# Patient Record
Sex: MALE | Race: OTHER | URBAN - METROPOLITAN AREA
[De-identification: names, ages, dates, MRNs, and addresses within clinical notes are randomized per-mention and may not be internally consistent; named-entity substitution may affect disease eponyms.]

---

## 2017-06-29 ENCOUNTER — IMPORTED ENCOUNTER (OUTPATIENT)
Dept: URBAN - METROPOLITAN AREA CLINIC 9 | Facility: CLINIC | Age: 62
End: 2017-06-29

## 2018-12-04 ENCOUNTER — IMPORTED ENCOUNTER (OUTPATIENT)
Dept: URBAN - METROPOLITAN AREA CLINIC 9 | Facility: CLINIC | Age: 63
End: 2018-12-04

## 2019-12-16 ENCOUNTER — IMPORTED ENCOUNTER (OUTPATIENT)
Dept: URBAN - METROPOLITAN AREA CLINIC 9 | Facility: CLINIC | Age: 64
End: 2019-12-16

## 2021-08-19 ENCOUNTER — IMPORTED ENCOUNTER (OUTPATIENT)
Dept: URBAN - METROPOLITAN AREA CLINIC 9 | Facility: CLINIC | Age: 66
End: 2021-08-19

## 2021-08-19 PROBLEM — H25.13: Noted: 2021-08-19

## 2021-08-19 PROBLEM — H02.831: Noted: 2021-08-19

## 2021-08-19 PROBLEM — H04.123: Noted: 2021-08-19

## 2021-08-19 PROBLEM — H02.834: Noted: 2021-08-19

## 2021-08-19 PROBLEM — H10.45: Noted: 2021-08-19

## 2021-10-15 ASSESSMENT — KERATOMETRY
OD_AXISANGLE2_DEGREES: 93
OS_AXISANGLE_DEGREES: 165
OD_AXISANGLE_DEGREES: 179
OS_K1POWER_DIOPTERS: 43.75
OS_AXISANGLE2_DEGREES: 76
OS_K1POWER_DIOPTERS: 44.25
OS_AXISANGLE_DEGREES: 166
OD_K2POWER_DIOPTERS: 45.25
OS_AXISANGLE_DEGREES: 165
OS_AXISANGLE2_DEGREES: 75
OD_K1POWER_DIOPTERS: 43
OD_AXISANGLE_DEGREES: 3
OD_K2POWER_DIOPTERS: 45
OD_AXISANGLE2_DEGREES: 83
OD_AXISANGLE2_DEGREES: 89
OD_K1POWER_DIOPTERS: 43.25
OD_K1POWER_DIOPTERS: 43.5
OS_K2POWER_DIOPTERS: 44.5
OD_K2POWER_DIOPTERS: 42.25
OS_AXISANGLE2_DEGREES: 86
OD_K1POWER_DIOPTERS: 43.5
OS_K2POWER_DIOPTERS: 44.5
OS_K1POWER_DIOPTERS: 44
OS_K2POWER_DIOPTERS: 44
OS_AXISANGLE_DEGREES: 176
OD_AXISANGLE2_DEGREES: 87
OD_AXISANGLE_DEGREES: 177
OS_K1POWER_DIOPTERS: 44
OD_AXISANGLE_DEGREES: 173
OS_K2POWER_DIOPTERS: 45.25
OS_AXISANGLE2_DEGREES: 75
OD_K2POWER_DIOPTERS: 45.25

## 2021-10-15 ASSESSMENT — TONOMETRY
OS_IOP_MMHG: 13
OD_IOP_MMHG: 11
OS_IOP_MMHG: 11
OD_IOP_MMHG: 16
OD_IOP_MMHG: 10
OS_IOP_MMHG: 16
OS_IOP_MMHG: 12
OD_IOP_MMHG: 11

## 2021-10-15 ASSESSMENT — VISUAL ACUITY
OS_SC: 20/100 + SN
OD_SC: 20/70 - SN
OD_CC: 20/20 - SN
OD_CC: 20/20 SN
OD_CC: 20/20 SN
OS_CC: 20/20 SN
OS_SC: 20/40 -2 SN
OD_SC: 20/100 + SN
OD_PH: 20/30 - SN
OS_CC: 20/25 -2 SN
OS_CC: 20/20 SN
OS_SC: 20/60 - SN
OS_CC: 20/20 - SN
OD_SC: 20/60 - SN
OD_CC: 20/20 SN
OD_CC: 20/25 SN
OS_CC: 14.0
OS_CC: 20/20 SN
OD_CC: 14.5

## 2022-06-29 RX ORDER — PHENTERMINE AND TOPIRAMATE 7.5; 46 MG/1; MG/1
1 CAPSULE, EXTENDED RELEASE ORAL
COMMUNITY
End: 2022-10-20

## 2022-06-29 RX ORDER — ATORVASTATIN CALCIUM 20 MG/1
TABLET, FILM COATED ORAL
COMMUNITY

## 2022-06-29 RX ORDER — HYDROCHLOROTHIAZIDE 25 MG/1
TABLET ORAL
COMMUNITY
End: 2022-08-22 | Stop reason: CLARIF

## 2022-06-29 RX ORDER — ALLOPURINOL 100 MG/1
TABLET ORAL
COMMUNITY
End: 2022-10-03

## 2022-06-29 RX ORDER — AZELASTINE 1 MG/ML
SPRAY, METERED NASAL
COMMUNITY

## 2022-10-03 ENCOUNTER — ESTABLISHED PATIENT (OUTPATIENT)
Dept: URBAN - METROPOLITAN AREA CLINIC 9 | Facility: CLINIC | Age: 67
End: 2022-10-03

## 2022-10-03 DIAGNOSIS — H01.02B: ICD-10-CM

## 2022-10-03 DIAGNOSIS — H52.13: ICD-10-CM

## 2022-10-03 DIAGNOSIS — H01.02A: ICD-10-CM

## 2022-10-03 DIAGNOSIS — H25.13: ICD-10-CM

## 2022-10-03 DIAGNOSIS — H04.123: ICD-10-CM

## 2022-10-03 PROCEDURE — 92015 DETERMINE REFRACTIVE STATE: CPT

## 2022-10-03 PROCEDURE — 92310B CONTACT LEN 60

## 2022-10-03 PROCEDURE — 92014 COMPRE OPH EXAM EST PT 1/>: CPT

## 2022-10-03 ASSESSMENT — KERATOMETRY
OS_AXISANGLE_DEGREES: 155
OD_AXISANGLE_DEGREES: 180
OS_K1POWER_DIOPTERS: 43.75
OS_AXISANGLE2_DEGREES: 65
OD_K2POWER_DIOPTERS: 45.0
OD_K1POWER_DIOPTERS: 43.50
OD_AXISANGLE2_DEGREES: 90
OS_K2POWER_DIOPTERS: 44.50

## 2022-10-03 ASSESSMENT — VISUAL ACUITY
OD_CC: J1+
OU_CC: J1+
OU_CC: 20/20
OS_CC: 20/20
OD_CC: 20/20
OS_CC: J1+

## 2022-10-03 ASSESSMENT — TONOMETRY
OD_IOP_MMHG: 11
OS_IOP_MMHG: 11

## 2022-10-17 PROBLEM — R23.8 IMPAIRED TISSUE INTEGRITY: Status: ACTIVE | Noted: 2022-10-17

## 2022-10-17 PROBLEM — E66.01 MORBID (SEVERE) OBESITY DUE TO EXCESS CALORIES (HCC): Status: ACTIVE | Noted: 2022-10-17

## 2022-10-17 PROBLEM — Z96.659 HISTORY OF TOTAL KNEE ARTHROPLASTY: Status: ACTIVE | Noted: 2021-05-05

## 2022-10-17 PROBLEM — J30.1 SEASONAL ALLERGIC RHINITIS DUE TO POLLEN: Status: ACTIVE | Noted: 2022-10-17

## 2022-10-17 PROBLEM — E78.2 MIXED HYPERLIPIDEMIA: Status: ACTIVE | Noted: 2022-10-17

## 2022-10-17 PROBLEM — Z96.619 STATUS POST SHOULDER JOINT REPLACEMENT: Status: ACTIVE | Noted: 2019-08-28

## 2022-10-17 PROBLEM — J30.2 SEASONAL ALLERGIES: Status: ACTIVE | Noted: 2022-10-17

## 2022-10-17 PROBLEM — M10.9 GOUT: Status: ACTIVE | Noted: 2022-10-17

## 2022-10-17 PROBLEM — I10 ESSENTIAL (PRIMARY) HYPERTENSION: Status: ACTIVE | Noted: 2022-10-17

## 2022-10-17 PROBLEM — M17.0 OSTEOARTHRITIS OF BOTH KNEES: Status: ACTIVE | Noted: 2022-10-17

## 2022-10-17 PROBLEM — M15.9 PRIMARY OSTEOARTHRITIS INVOLVING MULTIPLE JOINTS: Status: ACTIVE | Noted: 2022-10-17

## 2022-10-17 PROBLEM — M54.9 BACKACHE: Status: ACTIVE | Noted: 2022-10-17

## 2022-10-17 PROBLEM — M25.569 KNEE PAIN: Status: ACTIVE | Noted: 2022-10-17

## 2022-10-17 PROBLEM — S83.249A TEAR OF MEDIAL MENISCUS OF KNEE: Status: ACTIVE | Noted: 2020-01-29

## 2022-10-17 PROBLEM — Z87.898 HISTORY OF VERTIGO: Status: ACTIVE | Noted: 2022-10-17

## 2022-10-17 PROBLEM — Z87.19 H/O: GASTROINTESTINAL DISEASE: Status: ACTIVE | Noted: 2022-10-17

## 2022-10-17 PROBLEM — G47.33 OBSTRUCTIVE SLEEP APNEA SYNDROME: Status: ACTIVE | Noted: 2022-10-17

## 2022-10-17 PROBLEM — R10.9 ABDOMINAL PAIN: Status: ACTIVE | Noted: 2022-10-17

## 2022-10-17 PROBLEM — Z86.19 HISTORY OF HERPES ZOSTER: Status: ACTIVE | Noted: 2022-10-17

## 2022-10-17 PROBLEM — Z96.652 HISTORY OF ARTHROPLASTY OF LEFT KNEE: Status: ACTIVE | Noted: 2021-07-21

## 2022-10-17 PROBLEM — K57.92 DIVERTICULITIS: Status: ACTIVE | Noted: 2022-10-17

## 2022-10-17 PROBLEM — M65.9 SYNOVITIS OF LEFT KNEE: Status: ACTIVE | Noted: 2020-07-30

## 2022-10-17 PROBLEM — R52 ALTERATION IN COMFORT ASSOCIATED WITH PAIN: Status: ACTIVE | Noted: 2022-10-17

## 2022-10-17 PROBLEM — Z99.89 DEPENDENCE ON OTHER ENABLING MACHINES AND DEVICES: Status: ACTIVE | Noted: 2022-10-17

## 2022-10-20 PROBLEM — J45.901 REACTIVE AIRWAY DISEASE WITH ACUTE EXACERBATION: Status: ACTIVE | Noted: 2022-10-20

## 2022-10-21 PROBLEM — R73.01 IFG (IMPAIRED FASTING GLUCOSE): Status: ACTIVE | Noted: 2022-10-21

## 2022-10-23 PROBLEM — S91.312A LACERATION OF LEFT FOOT: Status: ACTIVE | Noted: 2022-10-23

## 2023-07-18 ENCOUNTER — FOLLOW UP (OUTPATIENT)
Dept: URBAN - METROPOLITAN AREA CLINIC 9 | Facility: CLINIC | Age: 68
End: 2023-07-18

## 2023-07-18 DIAGNOSIS — H01.02A: ICD-10-CM

## 2023-07-18 DIAGNOSIS — H04.123: ICD-10-CM

## 2023-07-18 DIAGNOSIS — H01.02B: ICD-10-CM

## 2023-07-18 PROCEDURE — 92012 INTRM OPH EXAM EST PATIENT: CPT

## 2023-07-18 ASSESSMENT — KERATOMETRY
OS_K1POWER_DIOPTERS: 43.75
OS_K2POWER_DIOPTERS: 44.50
OD_AXISANGLE_DEGREES: 180
OD_AXISANGLE2_DEGREES: 90
OD_K1POWER_DIOPTERS: 43.50
OS_AXISANGLE2_DEGREES: 65
OS_AXISANGLE_DEGREES: 155
OD_K2POWER_DIOPTERS: 45.0

## 2023-07-18 ASSESSMENT — VISUAL ACUITY
OU_SC: 20/20-1
OD_SC: 20/25-1
OS_SC: 20/20

## 2023-07-18 ASSESSMENT — TONOMETRY
OS_IOP_MMHG: 14
OD_IOP_MMHG: 17

## 2023-08-21 ENCOUNTER — FOLLOW UP (OUTPATIENT)
Dept: URBAN - METROPOLITAN AREA CLINIC 9 | Facility: CLINIC | Age: 68
End: 2023-08-21

## 2023-08-21 DIAGNOSIS — H01.02A: ICD-10-CM

## 2023-08-21 DIAGNOSIS — H01.02B: ICD-10-CM

## 2023-08-21 DIAGNOSIS — H04.123: ICD-10-CM

## 2023-08-21 PROCEDURE — 99213 OFFICE O/P EST LOW 20 MIN: CPT

## 2023-08-21 ASSESSMENT — KERATOMETRY
OS_AXISANGLE2_DEGREES: 65
OS_K1POWER_DIOPTERS: 43.75
OD_K1POWER_DIOPTERS: 43.50
OS_K2POWER_DIOPTERS: 44.50
OD_AXISANGLE_DEGREES: 180
OD_AXISANGLE2_DEGREES: 90
OD_K2POWER_DIOPTERS: 45.0
OS_AXISANGLE_DEGREES: 155

## 2023-08-21 ASSESSMENT — VISUAL ACUITY
OU_SC: 20/20
OS_SC: 20/20
OD_SC: 20/20-1

## 2023-08-21 ASSESSMENT — TONOMETRY
OD_IOP_MMHG: 18
OS_IOP_MMHG: 18

## 2023-10-02 ENCOUNTER — ESTABLISHED PATIENT (OUTPATIENT)
Facility: LOCATION | Age: 68
End: 2023-10-02

## 2023-10-02 DIAGNOSIS — H01.02B: ICD-10-CM

## 2023-10-02 DIAGNOSIS — H01.02A: ICD-10-CM

## 2023-10-02 DIAGNOSIS — H04.123: ICD-10-CM

## 2023-10-02 DIAGNOSIS — H52.13: ICD-10-CM

## 2023-10-02 DIAGNOSIS — H25.13: ICD-10-CM

## 2023-10-02 PROCEDURE — 92014 COMPRE OPH EXAM EST PT 1/>: CPT

## 2023-10-02 PROCEDURE — 92310E CONTACT LENS 100

## 2023-10-02 PROCEDURE — 92015 DETERMINE REFRACTIVE STATE: CPT

## 2023-10-02 ASSESSMENT — KERATOMETRY
OD_K1POWER_DIOPTERS: 43.25
OS_K1POWER_DIOPTERS: 44.00
OS_AXISANGLE2_DEGREES: 72
OD_AXISANGLE_DEGREES: 180
OD_AXISANGLE2_DEGREES: 90
OS_K2POWER_DIOPTERS: 44.50
OS_AXISANGLE_DEGREES: 162
OD_K2POWER_DIOPTERS: 45.00

## 2023-10-02 ASSESSMENT — TONOMETRY
OS_IOP_MMHG: 14
OD_IOP_MMHG: 14

## 2023-10-02 ASSESSMENT — VISUAL ACUITY
OD_SC: 20/20-2
OS_SC: 20/30-1

## 2024-04-26 ENCOUNTER — APPOINTMENT (RX ONLY)
Dept: URBAN - METROPOLITAN AREA CLINIC 20 | Facility: CLINIC | Age: 69
Setting detail: DERMATOLOGY
End: 2024-04-26

## 2024-04-26 DIAGNOSIS — D18.0 HEMANGIOMA: ICD-10-CM

## 2024-04-26 DIAGNOSIS — L81.4 OTHER MELANIN HYPERPIGMENTATION: ICD-10-CM

## 2024-04-26 DIAGNOSIS — L82.1 OTHER SEBORRHEIC KERATOSIS: ICD-10-CM

## 2024-04-26 DIAGNOSIS — D22 MELANOCYTIC NEVI: ICD-10-CM

## 2024-04-26 PROBLEM — D22.5 MELANOCYTIC NEVI OF TRUNK: Status: ACTIVE | Noted: 2024-04-26

## 2024-04-26 PROBLEM — D18.01 HEMANGIOMA OF SKIN AND SUBCUTANEOUS TISSUE: Status: ACTIVE | Noted: 2024-04-26

## 2024-04-26 PROBLEM — C44.519 BASAL CELL CARCINOMA OF SKIN OF OTHER PART OF TRUNK: Status: ACTIVE | Noted: 2024-04-26

## 2024-04-26 PROCEDURE — ? BIOPSY BY SHAVE METHOD

## 2024-04-26 PROCEDURE — 11102 TANGNTL BX SKIN SINGLE LES: CPT

## 2024-04-26 PROCEDURE — ? COUNSELING

## 2024-04-26 PROCEDURE — 99213 OFFICE O/P EST LOW 20 MIN: CPT | Mod: 25

## 2024-04-26 ASSESSMENT — LOCATION SIMPLE DESCRIPTION DERM
LOCATION SIMPLE: UPPER BACK
LOCATION SIMPLE: LEFT UPPER BACK
LOCATION SIMPLE: LEFT LOWER BACK
LOCATION SIMPLE: RIGHT UPPER BACK

## 2024-04-26 ASSESSMENT — LOCATION DETAILED DESCRIPTION DERM
LOCATION DETAILED: LEFT SUPERIOR UPPER BACK
LOCATION DETAILED: RIGHT SUPERIOR UPPER BACK
LOCATION DETAILED: LEFT INFERIOR MEDIAL MIDBACK
LOCATION DETAILED: INFERIOR THORACIC SPINE

## 2024-04-26 ASSESSMENT — LOCATION ZONE DERM: LOCATION ZONE: TRUNK

## 2024-04-26 NOTE — PROCEDURE: BIOPSY BY SHAVE METHOD
Detail Level: Detailed
Depth Of Biopsy: dermis
Was A Bandage Applied: Yes
Size Of Lesion In Cm: 0
Biopsy Type: H and E
Biopsy Method: Personna blade
Anesthesia Type: 1% lidocaine with epinephrine
Anesthesia Volume In Cc: 0.5
Hemostasis: Aluminum Chloride
Wound Care: Petrolatum
Dressing: Band-Aid
Destruction After The Procedure: No
Type Of Destruction Used: Curettage
Curettage Text: The wound bed was treated with curettage after the biopsy was performed.
Cryotherapy Text: The wound bed was treated with cryotherapy after the biopsy was performed.
Electrodesiccation Text: The wound bed was treated with electrodesiccation after the biopsy was performed.
Electrodesiccation And Curettage Text: The wound bed was treated with electrodesiccation and curettage after the biopsy was performed.
Silver Nitrate Text: The wound bed was treated with silver nitrate after the biopsy was performed.
Lab: [id number]
Lab Facility: [id number]
Path Notes (To The Dermatopathologist): 0.4cm
Consent: Written consent was obtained and risks were reviewed including but not limited to scarring, infection, bleeding, scabbing, incomplete removal, nerve damage and allergy to anesthesia.
Post-Care Instructions: The patient was instructed on proper post care to the biopsy site. Leave the bandage in place with the site clean and dry for 24 hours. Then remove the bandage, clean with mild soap and water, dry the site , apply fresh vaseline/aquaphor and a new bandage. Continue daily until the site is fully healed.
Notification Instructions: Patient will be notified of biopsy results. However, patient instructed to call the office if not contacted within 2 weeks.
Billing Type: Third-Party Bill
Information: Selecting Yes will display possible errors in your note based on the variables you have selected. This validation is only offered as a suggestion for you. PLEASE NOTE THAT THE VALIDATION TEXT WILL BE REMOVED WHEN YOU FINALIZE YOUR NOTE. IF YOU WANT TO FAX A PRELIMINARY NOTE YOU WILL NEED TO TOGGLE THIS TO 'NO' IF YOU DO NOT WANT IT IN YOUR FAXED NOTE.

## 2024-07-30 ENCOUNTER — APPOINTMENT (RX ONLY)
Dept: URBAN - METROPOLITAN AREA CLINIC 20 | Facility: CLINIC | Age: 69
Setting detail: DERMATOLOGY
End: 2024-07-30

## 2024-07-30 DIAGNOSIS — Z71.89 OTHER SPECIFIED COUNSELING: ICD-10-CM

## 2024-07-30 DIAGNOSIS — D485 NEOPLASM OF UNCERTAIN BEHAVIOR OF SKIN: ICD-10-CM

## 2024-07-30 DIAGNOSIS — L57.8 OTHER SKIN CHANGES DUE TO CHRONIC EXPOSURE TO NONIONIZING RADIATION: ICD-10-CM

## 2024-07-30 PROBLEM — D48.5 NEOPLASM OF UNCERTAIN BEHAVIOR OF SKIN: Status: ACTIVE | Noted: 2024-07-30

## 2024-07-30 PROCEDURE — 99213 OFFICE O/P EST LOW 20 MIN: CPT | Mod: 25

## 2024-07-30 PROCEDURE — ? BIOPSY BY SHAVE METHOD

## 2024-07-30 PROCEDURE — 11102 TANGNTL BX SKIN SINGLE LES: CPT

## 2024-07-30 PROCEDURE — ? COUNSELING

## 2024-07-30 ASSESSMENT — LOCATION SIMPLE DESCRIPTION DERM
LOCATION SIMPLE: LEFT CHEEK
LOCATION SIMPLE: LEFT KNEE

## 2024-07-30 ASSESSMENT — LOCATION DETAILED DESCRIPTION DERM
LOCATION DETAILED: LEFT KNEE
LOCATION DETAILED: LEFT INFERIOR CENTRAL MALAR CHEEK
LOCATION DETAILED: LEFT CENTRAL MALAR CHEEK

## 2024-07-30 ASSESSMENT — LOCATION ZONE DERM
LOCATION ZONE: LEG
LOCATION ZONE: FACE

## 2024-07-30 ASSESSMENT — LESION DIAMETER IN CM
PLEASE MEASURE THE GREATEST DIAMETER IN CM. IF MORE THAN ONE LESION, PLEASE SUM THE GREATEST DIAMETER OF ALL TREATED LESIONS.: 0.6

## 2024-07-30 NOTE — PROCEDURE: BIOPSY BY SHAVE METHOD
Detail Level: Detailed
Depth Of Biopsy: dermis
Was A Bandage Applied: Yes
Size Of Lesion In Cm: 0
Biopsy Type: H and E
Biopsy Method: Personna blade
Anesthesia Type: 1% lidocaine with epinephrine
Anesthesia Volume In Cc: 0.5
Hemostasis: Aluminum Chloride
Wound Care: Petrolatum
Dressing: Band-Aid
Destruction After The Procedure: No
Type Of Destruction Used: Curettage
Curettage Text: The wound bed was treated with curettage after the biopsy was performed.
Cryotherapy Text: The wound bed was treated with cryotherapy after the biopsy was performed.
Electrodesiccation Text: The wound bed was treated with electrodesiccation after the biopsy was performed.
Electrodesiccation And Curettage Text: The wound bed was treated with electrodesiccation and curettage after the biopsy was performed.
Silver Nitrate Text: The wound bed was treated with silver nitrate after the biopsy was performed.
Lab: [id number]
Lab Facility: 
Consent: Written consent was obtained and risks were reviewed including but not limited to scarring, infection, bleeding, scabbing, incomplete removal, nerve damage and allergy to anesthesia.
Post-Care Instructions: The patient was instructed on proper post care to the biopsy site. Leave the bandage in place with the site clean and dry for 24 hours. Then remove the bandage, clean with mild soap and water, dry the site , apply fresh vaseline/aquaphor and a new bandage. Continue daily until the site is fully healed.
Notification Instructions: Patient will be notified of biopsy results. However, patient instructed to call the office if not contacted within 2 weeks.
Billing Type: Third-Party Bill
Information: Selecting Yes will display possible errors in your note based on the variables you have selected. This validation is only offered as a suggestion for you. PLEASE NOTE THAT THE VALIDATION TEXT WILL BE REMOVED WHEN YOU FINALIZE YOUR NOTE. IF YOU WANT TO FAX A PRELIMINARY NOTE YOU WILL NEED TO TOGGLE THIS TO 'NO' IF YOU DO NOT WANT IT IN YOUR FAXED NOTE.

## 2024-10-02 ENCOUNTER — COMPREHENSIVE EXAM (OUTPATIENT)
Facility: LOCATION | Age: 69
End: 2024-10-02

## 2024-10-02 DIAGNOSIS — H52.13: ICD-10-CM

## 2024-10-02 DIAGNOSIS — H25.13: ICD-10-CM

## 2024-10-02 DIAGNOSIS — H04.123: ICD-10-CM

## 2024-10-02 DIAGNOSIS — H43.811: ICD-10-CM

## 2024-10-02 DIAGNOSIS — H10.45: ICD-10-CM

## 2024-10-02 DIAGNOSIS — H01.02A: ICD-10-CM

## 2024-10-02 DIAGNOSIS — H01.02B: ICD-10-CM

## 2024-10-02 PROCEDURE — 92310E CONTACT LENS 100

## 2024-10-02 PROCEDURE — 92015 DETERMINE REFRACTIVE STATE: CPT

## 2024-10-02 PROCEDURE — 92014 COMPRE OPH EXAM EST PT 1/>: CPT
